# Patient Record
Sex: FEMALE | Race: WHITE | NOT HISPANIC OR LATINO | URBAN - METROPOLITAN AREA
[De-identification: names, ages, dates, MRNs, and addresses within clinical notes are randomized per-mention and may not be internally consistent; named-entity substitution may affect disease eponyms.]

---

## 2021-10-29 ENCOUNTER — TELEPHONE (OUTPATIENT)
Dept: OBGYN CLINIC | Facility: CLINIC | Age: 41
End: 2021-10-29

## 2021-10-29 DIAGNOSIS — R92.8 ABNORMAL MAMMOGRAM OF RIGHT BREAST: Primary | ICD-10-CM

## 2022-11-03 ENCOUNTER — TELEPHONE (OUTPATIENT)
Dept: OBGYN CLINIC | Facility: CLINIC | Age: 42
End: 2022-11-03

## 2022-11-03 DIAGNOSIS — Z12.31 ENCOUNTER FOR SCREENING MAMMOGRAM FOR MALIGNANT NEOPLASM OF BREAST: Primary | ICD-10-CM

## 2022-11-04 NOTE — TELEPHONE ENCOUNTER
Order placed, call and notified patient printed at [facility] for pickup at her convenience

## 2022-12-16 NOTE — PROGRESS NOTES
Assessment/Plan:    Encounter for gynecological examination (general) (routine) without abnormal findings  All well, no complaints  No cycle concerns  Contraception: vasectomy  Normal breast and pelvic exams  Last pap 2020 neg/HPV neg; due   Diagnostic mammo order given, last 10/15/2021([facility] 3C)       Diagnoses and all orders for this visit:    Encounter for gynecological examination (general) (routine) without abnormal findings    Breast cancer screening by mammogram  -     Mammo screening bilateral w 3d & cad; Future    Inconclusive mammogram  -     Mammo diagnostic bilateral w cad; Future    Other orders  -     Liquid-based pap, screening  -     Liquid-based pap, screening  -     escitalopram (LEXAPRO) 10 mg tablet; Take 10 mg by mouth daily  -     hydrochlorothiazide (HYDRODIURIL) 25 mg tablet; Take 25 mg by mouth daily  -     Multiple Vitamins-Minerals (ONE-A-DAY WOMENS PO); Take by mouth          Subjective:      Patient ID: Alfreda Yo is a 43 y o  female  HPI  Here for well check  Last visit 2021 with BS    The following portions of the patient's history were reviewed and updated as appropriate:   She  has a past medical history of History of gestational diabetes mellitus (GDM)  She   Patient Active Problem List    Diagnosis Date Noted   • Encounter for gynecological examination (general) (routine) without abnormal findings 2022     She  has a past surgical history that includes  section  Her family history includes Diabetes in her paternal grandmother; Drug abuse in her brother; Heart disease in her maternal grandmother; Hypertension in her father and mother; No Known Problems in her maternal grandfather, son, and son; Prostate cancer in her paternal grandfather  She  reports that she quit smoking about 3 years ago  Her smoking use included cigarettes  She has never used smokeless tobacco  She reports current alcohol use   She reports that she does not use drugs   Current Outpatient Medications   Medication Sig Dispense Refill   • escitalopram (LEXAPRO) 10 mg tablet Take 10 mg by mouth daily     • hydrochlorothiazide (HYDRODIURIL) 25 mg tablet Take 25 mg by mouth daily     • Multiple Vitamins-Minerals (ONE-A-DAY WOMENS PO) Take by mouth       No current facility-administered medications for this visit  She has No Known Allergies       Review of Systems  No breast, bladder, bowel changes   No new persistent pain, bloating, early satiety or pelvic pressure  No menorrhagia, dysmenorrhea, intermenstrual bleeding      Objective:      /70 (BP Location: Left arm, Patient Position: Sitting, Cuff Size: Standard)   Ht 4' 11 5" (1 511 m)   Wt 61 8 kg (136 lb 3 2 oz)   LMP 12/08/2022 (Exact Date)   Breastfeeding No   BMI 27 05 kg/m²          Physical Exam    General appearance: no distress, pleasant  Neck: thyroid without nodules or thyromegaly, no palpable adenopathy  Lymph nodes: no palpable adenopathy  Breasts: no masses, nodes or skin changes  Abdomen: soft, non tender, no palpable masses  Pelvic exam: normal external genitalia, urethral meatus normal, vagina high posterior rectocele, cervix high and anterior without lesions, uterus small, non tender, no adnexal masses, non tender  Rectal exam: no masses, non tender, RV confirms above

## 2022-12-20 ENCOUNTER — ANNUAL EXAM (OUTPATIENT)
Dept: OBGYN CLINIC | Facility: CLINIC | Age: 42
End: 2022-12-20

## 2022-12-20 VITALS
SYSTOLIC BLOOD PRESSURE: 132 MMHG | DIASTOLIC BLOOD PRESSURE: 70 MMHG | BODY MASS INDEX: 26.74 KG/M2 | HEIGHT: 60 IN | WEIGHT: 136.2 LBS

## 2022-12-20 DIAGNOSIS — Z01.419 ENCOUNTER FOR GYNECOLOGICAL EXAMINATION (GENERAL) (ROUTINE) WITHOUT ABNORMAL FINDINGS: Primary | ICD-10-CM

## 2022-12-20 DIAGNOSIS — R92.2 INCONCLUSIVE MAMMOGRAM: ICD-10-CM

## 2022-12-20 DIAGNOSIS — Z12.31 BREAST CANCER SCREENING BY MAMMOGRAM: ICD-10-CM

## 2022-12-20 RX ORDER — ESCITALOPRAM OXALATE 10 MG/1
10 TABLET ORAL DAILY
COMMUNITY
Start: 2022-10-01

## 2022-12-20 RX ORDER — HYDROCHLOROTHIAZIDE 25 MG/1
25 TABLET ORAL DAILY
COMMUNITY
Start: 2022-09-30

## 2022-12-20 NOTE — ASSESSMENT & PLAN NOTE
All well, no complaints  No cycle concerns  Contraception: vasectomy  Normal breast and pelvic exams    Last pap 12/2020 neg/HPV neg; due 2025  Diagnostic mammo order given, last 10/15/2021([facility]ADS 3C)

## 2022-12-20 NOTE — LETTER
December 20, 4983     Dayanna Reyes, [address] [address]    Patient: Joshua Munoz   YOB: 1980   Date of Visit: 12/20/2022       Dear Renan Xiong,     Thank you for referring Jimenez Kelly to me for evaluation  Below are my notes for this consultation  If you have questions, please do not hesitate to call me  I look forward to following your patient along with you  Sincerely,        Karly Edmond MD        CC: No Recipients  Karly Edmond MD  12/20/2022  1:15 PM  Sign when Signing Visit  Assessment/Plan:    Encounter for gynecological examination (general) (routine) without abnormal findings  All well, no complaints  No cycle concerns  Contraception: vasectomy  Normal breast and pelvic exams  Last pap 12/2020 neg/HPV neg; due 2025  Diagnostic mammo order given, last 10/15/2021([facility]ADS 3C)      Diagnoses and all orders for this visit:    Encounter for gynecological examination (general) (routine) without abnormal findings    Breast cancer screening by mammogram  -     Mammo screening bilateral w 3d & cad; Future    Inconclusive mammogram  -     Mammo diagnostic bilateral w cad; Future    Other orders  -     Liquid-based pap, screening  -     Liquid-based pap, screening  -     escitalopram (LEXAPRO) 10 mg tablet; Take 10 mg by mouth daily  -     hydrochlorothiazide (HYDRODIURIL) 25 mg tablet; Take 25 mg by mouth daily  -     Multiple Vitamins-Minerals (ONE-A-DAY WOMENS PO); Take by mouth         Subjective:     Patient ID: Joshua Munoz is a 43 y o  female  HPI  Here for well check  Last visit 1/2021 with BS    The following portions of the patient's history were reviewed and updated as appropriate:   She  has a past medical history of History of gestational diabetes mellitus (GDM)    She   Patient Active Problem List    Diagnosis Date Noted   • Encounter for gynecological examination (general) (routine) without abnormal findings 12/20/2022     She  has a past surgical history that includes  section  Her family history includes Diabetes in her paternal grandmother; Drug abuse in her brother; Heart disease in her maternal grandmother; Hypertension in her father and mother; No Known Problems in her maternal grandfather, son, and son; Prostate cancer in her paternal grandfather  She  reports that she quit smoking about 3 years ago  Her smoking use included cigarettes  She has never used smokeless tobacco  She reports current alcohol use  She reports that she does not use drugs  Current Outpatient Medications   Medication Sig Dispense Refill   • escitalopram (LEXAPRO) 10 mg tablet Take 10 mg by mouth daily     • hydrochlorothiazide (HYDRODIURIL) 25 mg tablet Take 25 mg by mouth daily     • Multiple Vitamins-Minerals (ONE-A-DAY WOMENS PO) Take by mouth       No current facility-administered medications for this visit  She has No Known Allergies       Review of Systems No breast, bladder, bowel changes   No new persistent pain, bloating, early satiety or pelvic pressure  No menorrhagia, dysmenorrhea, intermenstrual bleeding      Objective:      /70 (BP Location: Left arm, Patient Position: Sitting, Cuff Size: Standard)   Ht 4' 11 5" (1 511 m)   Wt 61 8 kg (136 lb 3 2 oz)   LMP 2022 (Exact Date)   Breastfeeding No   BMI 27 05 kg/m²         Physical Exam   General appearance: no distress, pleasant  Neck: thyroid without nodules or thyromegaly, no palpable adenopathy  Lymph nodes: no palpable adenopathy  Breasts: no masses, nodes or skin changes  Abdomen: soft, non tender, no palpable masses  Pelvic exam: normal external genitalia, urethral meatus normal, vagina high posterior rectocele, cervix high and anterior without lesions, uterus small, non tender, no adnexal masses, non tender  Rectal exam: no masses, non tender, RV confirms above

## 2023-01-04 ENCOUNTER — HOSPITAL ENCOUNTER (OUTPATIENT)
Dept: MAMMOGRAPHY | Facility: CLINIC | Age: 43
Discharge: HOME | End: 2023-01-04

## 2023-01-04 VITALS — BODY MASS INDEX: 27.05 KG/M2 | HEIGHT: 60 IN

## 2023-01-04 DIAGNOSIS — R92.2 INCONCLUSIVE MAMMOGRAM: ICD-10-CM
